# Patient Record
Sex: MALE | Race: BLACK OR AFRICAN AMERICAN | Employment: FULL TIME | URBAN - METROPOLITAN AREA
[De-identification: names, ages, dates, MRNs, and addresses within clinical notes are randomized per-mention and may not be internally consistent; named-entity substitution may affect disease eponyms.]

---
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
## 2021-06-02 ENCOUNTER — RECORDS - HEALTHEAST (OUTPATIENT)
Dept: ADMINISTRATIVE | Facility: CLINIC | Age: 21
End: 2021-06-02

## 2024-09-27 NOTE — ED PROVIDER NOTES
"  History     Chief Complaint   Patient presents with     Allergic Reaction     HPI    History obtained from patient    Elliot is a 17 year old male with known peanut allergy who presents at  8:33 PM with swelling of lips and difficulty swallowing saliva. Around 7:30pm he ate cookie dough ice cream and started feeling tingling around his mouth, \"heaviness\" of his lips, difficulty swallowing saliva and breathing fast. He did not have his EpiPen with him so they came to the ED immediately. He has otherwise been well. No nausea or vomiting. No abdominal pain. No dizziness. Does state his heart feels like it's fluttering sometimes, and feels like his cheeks are a bit itchy.    He was admitted about four months ago for anaphylaxis after ingesting peanuts (Snickers).     PMHx:  Past Medical History:   Diagnosis Date     Elbow fracture, right     at 1yo, treated with casting     DONNA (obstructive sleep apnea)     resolved after T/A in 2005     Overweight(278.02)      Past Surgical History:   Procedure Laterality Date     APPENDECTOMY       TONSILLECTOMY & ADENOIDECTOMY  2005    for DONNA     These were reviewed with the patient/family.    MEDICATIONS were reviewed and are as follows:     Current Outpatient Prescriptions   Medication     EPINEPHrine (EPIPEN/ADRENACLICK/OR ANY BX GENERIC EQUIV) 0.3 MG/0.3ML injection 2-pack     diphenhydrAMINE (BENADRYL ALLERGY) 25 MG capsule     acetaminophen (TYLENOL) 325 MG tablet     IBUPROFEN PO     ondansetron (ZOFRAN) 4 MG tablet     ALLERGIES:  Review of patient's allergies indicates no known allergies.    IMMUNIZATIONS:  UTD by report.    SOCIAL HISTORY: Elliot lives with parents.  He does attend high school.      I have reviewed the Medications, Allergies, Past Medical and Surgical History, and Social History in the Epic system.    Review of Systems  Please see HPI for pertinent positives and negatives.  All other systems reviewed and found to be negative.      Physical Exam   BP: " 121/75  Heart Rate: 76  Temp: 97.8  F (36.6  C)  Resp: 18  Weight: 99.7 kg (219 lb 12.8 oz)  SpO2: 100 %    Physical Exam  General: Awake and alert. Nontoxic, mild discomfort.   HEENT: Normocephalic, atraumatic. Conjunctiva, sclera clear. PERRL, EOMI. MMM, no mucosal lesions. No tonsillar erythema, exudates. No rhinorrhea. Tympanic membranes without erythema, effusions. Tolerating secretions.   CV: RRR. Normal S1 and S2. No murmurs, rubs appreciated. Warm, well-perfused. Strong peripheral pulses.   Resp: CTAB. Tachypneic. Moving air well throughout but scattered end-expiratory wheezes.  Abd: +BS. SNTND. No organomegaly appreciated.  Neuro: Moving all extremities equally. CN II-XII grossly intact.  Skin: Warm. No rashes appreciated, bruising.     ED Course     ED Course     Procedures    No results found for this or any previous visit (from the past 24 hour(s)).    Medications   dexamethasone (DECADRON) tablet 16 mg (not administered)   diphenhydrAMINE (BENADRYL) capsule 50 mg (not administered)   famotidine (PEPCID) tablet 25 mg (not administered)   EPINEPHrine (EPIPEN/ADRENACLICK/or ANY BX GENERIC EQUIV) injection 0.3 mg (0.3 mg Intramuscular Given 2/19/18 2041)     Pt seen and examined immediately upon arrival.   Epinephrine IM 0.3 mg x1  Improvement in tachypnea and wheezing but remainder of symptoms continued 1-hr post-epinephrine.  Benadryl, decadron and famotidine PO x1.  Observed in ED for 4 hours with resolution of respiratory symptoms but continued facial tingling/feeling of lips swelling and difficulty with swallowing. He remained hemodynamically stable.  PIV placed.   Discussed pt with admitting staff, Dr. Lamonte Patel, who agreed to accept pt for further management overnight on gen peds.    Critical care time:  none       Assessments & Plan (with Medical Decision Making)   Elliot is a 17yoM with known peanut allergy who presents with anaphylaxis after eating ice cream (criterion 2, meets criteria given  mucosal involvement and wheeze). Some improvement in his symptoms following epinephrine and subsequent benadryl, decadron and famotidine, but continues with mouth tingling and sensation of lip swelling and difficulty swallowing. He remains hemodynamically stable, without evidence of hypotension or impending cardiorespiratory failure. Does not require repeat epinephrine at this time but does warrant further monitoring overnight given the risk of biphasic reaction or worsening of his ongoing symptoms. Elliot and his father understanding of the plan, questions answered.    - Admit to general pediatrics, pt discussed with admitting staff and resident  - PIV in place  - Low threshold for second epinephrine or decadron/benadryl/famotidine    I have reviewed the nursing notes.    I have reviewed the findings, diagnosis, plan and need for follow up with the patient.  Discharge Medication List as of 2/20/2018 10:13 AM          Final diagnoses:   Anaphylaxis, initial encounter     Zhane Edmonds MD  Pediatric Resident, PL-3    This data was collected with the resident physician working in the Emergency Department.  I saw and evaluated the patient and repeated the key portions of the history and physical exam.  The plan of care has been discussed with the patient and family by me or by the resident under my supervision.  I have read and edited the entire note. I signed out his care to Dr. Ivy at 23:30 with reassessment and disposition pending. Piper Griggs MD    2/19/2018   [facility] EMERGENCY DEPARTMENT     Piper Griggs MD  02/22/18 0130     PAST MEDICAL HISTORY:  Frequent PVCs     S/P appendectomy